# Patient Record
(demographics unavailable — no encounter records)

---

## 2025-04-01 NOTE — HISTORY OF PRESENT ILLNESS
[de-identified] : Ms. DEL TORO is a 40-year-old woman who presents for acute swelling in her neck. In Jan/Feb, she had viral infection (URI, sinusitis) that took 2 months to finally resolve.  She was diagnosed with "virus A" and treated with antivirals.  She later had nasal congestion and swollen eyes, so treated with antibiotics. In early Feb, she went to [facility] for vacation.  She felt pain in her neck glands on the plane.  She had acute swelling of submandibular glands and left lower tooth pain that she treated with Tylenol.  Then pain traveled to midline neck which became swollen and she couldn't eat.  When she returned to [facility], she went to see another ENT doctor who ordered US neck and placed her on amoxicillin x 12 days --> pain and swelling resolved. She had CT neck at [facility] yesterday. Today, she went to urgent care because she had recurrent swelling and pain in front of her neck starting a few days ago. Yesterday, the neck opened and started draining orange-colored sticky liquid.  She was given Bactrim, and culture of the d/c was taken. Today, she still has drainage but less pain. History of thyroglossal duct cyst excision when she was 12 years old. Swelling in midline neck occurred in 2022. 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 S/p neck exploration on 6/13/2022, at which time I released scar tissue; no cyst or lymph node was found.  She healed and has had no neck swelling until this year.   CT NECK without contrast (3/27/2025) at [facility] - no report available. - mass at level of thyroid notch, centered to right of midline, and appears adherent to the overlying skin - hyoid bone is intact. (Images were reviewed.)      ULTRASOUND SOFT TISSUE NECK (02-) at [facility] - COMPARISON:  None - The right submandibular gland measures 3.6 x 0.8 x 3.2 cm. -The left submandibular gland measures 3.5 x 1.3 x 3.3 cm. - Both submandibular glands appear mildly heterogeneous echotexture suspicious for sialadenitis. Color Doppler imaging demonstrate normal vascularity. No cysts or solid nodule seen. There is no evidence of ductal dilatation. - Several nonenlarged normal appearing cervical lymph nodes are imaged bilaterally. There are no suspicious cervical lymph nodes. IMPRESSION: 1. Mild heterogeneous echotexture seen in both submandibular glands suspicious for sialadenitis. 2. No cystic or solid lesion seen in the submandibular glands. 3. No suspicious cervical lymph nodes seen.  US HEAD NECK SOFT TISSUE (4/03/2023) at [facility] - COMPARISON: Soft tissue neck ultrasound from 8/24/2022. - Ovoid hypoechoic avascular structure within the right paramedian midline neck soft tissues now measures 2.6 x 0.6 x 1.8 cm (tv x ap x cc), previously 1.3 x 0.6 x 1.2 cm. No new masses. - No abnormal lymph nodes identified. - There is no cyst or abscess. IMPRESSION: Since 8/24/2022, interval increase in the size of previously biopsied 2.6 cm right paramedian midline neck soft tissue lesion. Abbreviated differential includes an enlarging abnormal lymph node. Correlation with histology of ultrasound guided needle aspiration performed on 8/24/2022 and a follow-up contrast-enhanced CT neck and/or repeat ultrasound-guided FNA may be in order.   CYTOLOGY Midline neck FNA (8/30/2022) at [facility] - BENIGN FINDINGS - Ciliated columnar cells, macrophages, multinucleated cells, suggestive of thyroglossal duct cyst   CT NECK with contrast (07/22/2022) at [facility] - Prior Studies: None * SOFT TISSUES: Within the right paramedian submental space superficial to the hyoid bone and upper strap muscles, there is a 1.2 x 1.0 x 1.0 cm (transverse x AP x CC) hyperenhancing and presumed inflammatory nodule with adjacent fat stranding. This could reflect lymphadenitis of the deep level 1A node versus possible thyroglossal duct remnant although felt less likely given location superficial to the strap muscles and thyroid. However, history of prior cyst raises suspicion for small residual thyroglossal duct remnant. Inferior and to the right of the lesion there is a punctate dystrophic calcification. Posteriorly, there is preserved preepiglottic fat density and no evidence of deep neck cellulitis to the floor of mouth. No drainable or rim-enhancing fluid collection to indicate abscess. * AERODIGESTIVE TRACT: Unremarkable * LYMPH NODES: Small and symmetric reactive lymph nodes at level 2 stations. No other enhancing nodules present in the submandibular spaces. No supraclavicular adenopathy or central/retropharyngeal lymphadenopathy. * SALIVARY GLANDS: Unremarkable * THYROID GLAND: Unremarkable * ORBITS: Unremarkable * BRAIN: The partially imaged intracranial compartment is unremarkable allowing for mild motion limitation * SKULL BASE: Intact and unremarkable * PARANASAL SINUSES: Well aerated aside from small mucoid content along the left sphenoid sinus medially * MASTOIDS: Well aerated * CERVICAL SPINE: Straightened lordosis without acute osseous finding. No prevertebral swelling. * LUNG APICES: Unremarkable IMPRESSION: No abscess. At the palpable site of concern, there is a 1.2 cm ovoid hyperenhancing nodule at the deep submental space. This is favored to be an infected or inflamed level 1A lymph node. Location superficial to the strap muscles near the hyoid disfavors a thyroglossal duct, but diagnosis cannot be excluded given purported history of cyst removal as a child. No fat/fluid density to suggest dermoid/epidermoid cyst. If persistent despite a course of antibiotic therapy, ENT referral is suggested and targeted ultrasound with FNA could be obtained for tissue sampling.

## 2025-04-01 NOTE — ASSESSMENT
[FreeTextEntry1] : Ms. 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 I explained that the drainage is colloid, not pus.  Culture may not grow anything since no pus. She will need to have definitive excision of the TGDC and portion of hyoid after she heals.  --> continue with current would care and Bactrim  I will arrange for her to see one of my H&N colleagues since I will be leaving practice at end of April. She is agreeable to the plan.

## 2025-04-01 NOTE — PHYSICAL EXAM
[FreeTextEntry1] : No hoarseness.  [de-identified] : defined cystic mass that is open at her surgical scar and draining colloid.  Mild erythema and tenderness of overlying skin.  Mild level 2 cervical lymphadenopathy bilateral, NT. [de-identified] : Thyroid gland normal size, no palpable nodules.  [Normal] : mucosa is normal [de-identified] : See NECK

## 2025-04-01 NOTE — PHYSICAL EXAM
[FreeTextEntry1] : No hoarseness.  [de-identified] : defined cystic mass that is open at her surgical scar and draining colloid.  Mild erythema and tenderness of overlying skin.  Mild level 2 cervical lymphadenopathy bilateral, NT. [de-identified] : Thyroid gland normal size, no palpable nodules.  [Normal] : mucosa is normal [de-identified] : See NECK

## 2025-04-01 NOTE — HISTORY OF PRESENT ILLNESS
[de-identified] : Ms. DEL TORO is a 40-year-old woman who presents for acute swelling in her neck. In Jan/Feb, she had viral infection (URI, sinusitis) that took 2 months to finally resolve.  She was diagnosed with "virus A" and treated with antivirals.  She later had nasal congestion and swollen eyes, so treated with antibiotics. In early Feb, she went to [facility] for vacation.  She felt pain in her neck glands on the plane.  She had acute swelling of submandibular glands and left lower tooth pain that she treated with Tylenol.  Then pain traveled to midline neck which became swollen and she couldn't eat.  When she returned to [facility], she went to see another ENT doctor who ordered US neck and placed her on amoxicillin x 12 days --> pain and swelling resolved. She had CT neck at [facility] yesterday. Today, she went to urgent care because she had recurrent swelling and pain in front of her neck starting a few days ago. Yesterday, the neck opened and started draining orange-colored sticky liquid.  She was given Bactrim, and culture of the d/c was taken. Today, she still has drainage but less pain. History of thyroglossal duct cyst excision when she was 12 years old. Swelling in midline neck occurred in 2022. 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 S/p neck exploration on 6/13/2022, at which time I released scar tissue; no cyst or lymph node was found.  She healed and has had no neck swelling until this year.   CT NECK without contrast (3/27/2025) at [facility] - no report available. - mass at level of thyroid notch, centered to right of midline, and appears adherent to the overlying skin - hyoid bone is intact. (Images were reviewed.)      ULTRASOUND SOFT TISSUE NECK (02-) at [facility] - COMPARISON:  None - The right submandibular gland measures 3.6 x 0.8 x 3.2 cm. -The left submandibular gland measures 3.5 x 1.3 x 3.3 cm. - Both submandibular glands appear mildly heterogeneous echotexture suspicious for sialadenitis. Color Doppler imaging demonstrate normal vascularity. No cysts or solid nodule seen. There is no evidence of ductal dilatation. - Several nonenlarged normal appearing cervical lymph nodes are imaged bilaterally. There are no suspicious cervical lymph nodes. IMPRESSION: 1. Mild heterogeneous echotexture seen in both submandibular glands suspicious for sialadenitis. 2. No cystic or solid lesion seen in the submandibular glands. 3. No suspicious cervical lymph nodes seen.  US HEAD NECK SOFT TISSUE (4/03/2023) at [facility] - COMPARISON: Soft tissue neck ultrasound from 8/24/2022. - Ovoid hypoechoic avascular structure within the right paramedian midline neck soft tissues now measures 2.6 x 0.6 x 1.8 cm (tv x ap x cc), previously 1.3 x 0.6 x 1.2 cm. No new masses. - No abnormal lymph nodes identified. - There is no cyst or abscess. IMPRESSION: Since 8/24/2022, interval increase in the size of previously biopsied 2.6 cm right paramedian midline neck soft tissue lesion. Abbreviated differential includes an enlarging abnormal lymph node. Correlation with histology of ultrasound guided needle aspiration performed on 8/24/2022 and a follow-up contrast-enhanced CT neck and/or repeat ultrasound-guided FNA may be in order.   CYTOLOGY Midline neck FNA (8/30/2022) at [facility] - BENIGN FINDINGS - Ciliated columnar cells, macrophages, multinucleated cells, suggestive of thyroglossal duct cyst   CT NECK with contrast (07/22/2022) at [facility] - Prior Studies: None * SOFT TISSUES: Within the right paramedian submental space superficial to the hyoid bone and upper strap muscles, there is a 1.2 x 1.0 x 1.0 cm (transverse x AP x CC) hyperenhancing and presumed inflammatory nodule with adjacent fat stranding. This could reflect lymphadenitis of the deep level 1A node versus possible thyroglossal duct remnant although felt less likely given location superficial to the strap muscles and thyroid. However, history of prior cyst raises suspicion for small residual thyroglossal duct remnant. Inferior and to the right of the lesion there is a punctate dystrophic calcification. Posteriorly, there is preserved preepiglottic fat density and no evidence of deep neck cellulitis to the floor of mouth. No drainable or rim-enhancing fluid collection to indicate abscess. * AERODIGESTIVE TRACT: Unremarkable * LYMPH NODES: Small and symmetric reactive lymph nodes at level 2 stations. No other enhancing nodules present in the submandibular spaces. No supraclavicular adenopathy or central/retropharyngeal lymphadenopathy. * SALIVARY GLANDS: Unremarkable * THYROID GLAND: Unremarkable * ORBITS: Unremarkable * BRAIN: The partially imaged intracranial compartment is unremarkable allowing for mild motion limitation * SKULL BASE: Intact and unremarkable * PARANASAL SINUSES: Well aerated aside from small mucoid content along the left sphenoid sinus medially * MASTOIDS: Well aerated * CERVICAL SPINE: Straightened lordosis without acute osseous finding. No prevertebral swelling. * LUNG APICES: Unremarkable IMPRESSION: No abscess. At the palpable site of concern, there is a 1.2 cm ovoid hyperenhancing nodule at the deep submental space. This is favored to be an infected or inflamed level 1A lymph node. Location superficial to the strap muscles near the hyoid disfavors a thyroglossal duct, but diagnosis cannot be excluded given purported history of cyst removal as a child. No fat/fluid density to suggest dermoid/epidermoid cyst. If persistent despite a course of antibiotic therapy, ENT referral is suggested and targeted ultrasound with FNA could be obtained for tissue sampling.